# Patient Record
Sex: FEMALE | Race: WHITE | NOT HISPANIC OR LATINO | Employment: FULL TIME | URBAN - METROPOLITAN AREA
[De-identification: names, ages, dates, MRNs, and addresses within clinical notes are randomized per-mention and may not be internally consistent; named-entity substitution may affect disease eponyms.]

---

## 2019-01-24 RX ORDER — FLUOXETINE 40 MG/1
40 CAPSULE ORAL 2 TIMES DAILY
COMMUNITY

## 2019-01-24 RX ORDER — LOSARTAN POTASSIUM 50 MG/1
50 TABLET ORAL DAILY
COMMUNITY
End: 2021-10-27

## 2019-01-24 RX ORDER — BUSPIRONE HYDROCHLORIDE 15 MG/1
30 TABLET ORAL DAILY
COMMUNITY

## 2019-01-28 RX ORDER — POTASSIUM CHLORIDE 1500 MG/1
20 TABLET, EXTENDED RELEASE ORAL DAILY
COMMUNITY

## 2019-01-28 RX ORDER — TRAZODONE HYDROCHLORIDE 50 MG/1
50 TABLET, FILM COATED ORAL AT BEDTIME
COMMUNITY

## 2019-01-29 ENCOUNTER — ANESTHESIA EVENT (OUTPATIENT)
Dept: SURGERY | Facility: CLINIC | Age: 59
End: 2019-01-29
Payer: COMMERCIAL

## 2019-01-29 ENCOUNTER — HOSPITAL ENCOUNTER (OUTPATIENT)
Facility: CLINIC | Age: 59
Discharge: HOME | End: 2019-01-29
Attending: OPHTHALMOLOGY | Admitting: OPHTHALMOLOGY
Payer: COMMERCIAL

## 2019-01-29 ENCOUNTER — ANESTHESIA (OUTPATIENT)
Dept: SURGERY | Facility: CLINIC | Age: 59
End: 2019-01-29
Payer: COMMERCIAL

## 2019-01-29 VITALS
OXYGEN SATURATION: 95 % | TEMPERATURE: 97.9 F | BODY MASS INDEX: 47.19 KG/M2 | DIASTOLIC BLOOD PRESSURE: 91 MMHG | HEART RATE: 75 BPM | SYSTOLIC BLOOD PRESSURE: 144 MMHG | RESPIRATION RATE: 16 BRPM | WEIGHT: 276.4 LBS | HEIGHT: 64 IN

## 2019-01-29 DIAGNOSIS — H02.834 DERMATOCHALASIS OF UPPER AND LOWER EYELIDS OF BOTH EYES: Primary | ICD-10-CM

## 2019-01-29 DIAGNOSIS — H02.831 DERMATOCHALASIS OF UPPER AND LOWER EYELIDS OF BOTH EYES: Primary | ICD-10-CM

## 2019-01-29 DIAGNOSIS — H02.832 DERMATOCHALASIS OF UPPER AND LOWER EYELIDS OF BOTH EYES: Primary | ICD-10-CM

## 2019-01-29 DIAGNOSIS — H02.835 DERMATOCHALASIS OF UPPER AND LOWER EYELIDS OF BOTH EYES: Primary | ICD-10-CM

## 2019-01-29 PROCEDURE — 71000012 ZZH RECOVERY PHASE 1 LEVEL 1 FIRST HR: Performed by: OPHTHALMOLOGY

## 2019-01-29 PROCEDURE — 37000008 ZZH ANESTHESIA TECHNICAL FEE, 1ST 30 MIN: Performed by: OPHTHALMOLOGY

## 2019-01-29 PROCEDURE — 37000009 ZZH ANESTHESIA TECHNICAL FEE, EACH ADDTL 15 MIN: Performed by: OPHTHALMOLOGY

## 2019-01-29 PROCEDURE — 27210794 ZZH OR GENERAL SUPPLY STERILE: Performed by: OPHTHALMOLOGY

## 2019-01-29 PROCEDURE — 25000128 H RX IP 250 OP 636: Performed by: NURSE ANESTHETIST, CERTIFIED REGISTERED

## 2019-01-29 PROCEDURE — 36000058 ZZH SURGERY LEVEL 3 EA 15 ADDTL MIN

## 2019-01-29 PROCEDURE — 25000125 ZZHC RX 250: Performed by: NURSE ANESTHETIST, CERTIFIED REGISTERED

## 2019-01-29 PROCEDURE — 25000132 ZZH RX MED GY IP 250 OP 250 PS 637: Performed by: ANESTHESIOLOGY

## 2019-01-29 PROCEDURE — 37000009 ZZH ANESTHESIA TECHNICAL FEE, EACH ADDTL 15 MIN

## 2019-01-29 PROCEDURE — 36000056 ZZH SURGERY LEVEL 3 1ST 30 MIN: Performed by: OPHTHALMOLOGY

## 2019-01-29 PROCEDURE — 71000027 ZZH RECOVERY PHASE 2 EACH 15 MINS: Performed by: OPHTHALMOLOGY

## 2019-01-29 PROCEDURE — 40000170 ZZH STATISTIC PRE-PROCEDURE ASSESSMENT II: Performed by: OPHTHALMOLOGY

## 2019-01-29 PROCEDURE — 25000125 ZZHC RX 250: Performed by: OPHTHALMOLOGY

## 2019-01-29 PROCEDURE — 36000058 ZZH SURGERY LEVEL 3 EA 15 ADDTL MIN: Performed by: OPHTHALMOLOGY

## 2019-01-29 PROCEDURE — 25000566 ZZH SEVOFLURANE, EA 15 MIN: Performed by: OPHTHALMOLOGY

## 2019-01-29 RX ORDER — ERYTHROMYCIN 5 MG/G
OINTMENT OPHTHALMIC PRN
Status: DISCONTINUED | OUTPATIENT
Start: 2019-01-29 | End: 2019-01-29 | Stop reason: HOSPADM

## 2019-01-29 RX ORDER — HYDROMORPHONE HYDROCHLORIDE 1 MG/ML
.3-.5 INJECTION, SOLUTION INTRAMUSCULAR; INTRAVENOUS; SUBCUTANEOUS EVERY 10 MIN PRN
Status: DISCONTINUED | OUTPATIENT
Start: 2019-01-29 | End: 2019-01-29 | Stop reason: HOSPADM

## 2019-01-29 RX ORDER — LIDOCAINE HYDROCHLORIDE 20 MG/ML
INJECTION, SOLUTION INFILTRATION; PERINEURAL PRN
Status: DISCONTINUED | OUTPATIENT
Start: 2019-01-29 | End: 2019-01-29

## 2019-01-29 RX ORDER — FENTANYL CITRATE 50 UG/ML
INJECTION, SOLUTION INTRAMUSCULAR; INTRAVENOUS PRN
Status: DISCONTINUED | OUTPATIENT
Start: 2019-01-29 | End: 2019-01-29

## 2019-01-29 RX ORDER — NALOXONE HYDROCHLORIDE 0.4 MG/ML
.1-.4 INJECTION, SOLUTION INTRAMUSCULAR; INTRAVENOUS; SUBCUTANEOUS
Status: DISCONTINUED | OUTPATIENT
Start: 2019-01-29 | End: 2019-01-29 | Stop reason: HOSPADM

## 2019-01-29 RX ORDER — MULTIPLE VITAMINS W/ MINERALS TAB 9MG-400MCG
1 TAB ORAL DAILY
COMMUNITY
End: 2022-12-07

## 2019-01-29 RX ORDER — DEXAMETHASONE SODIUM PHOSPHATE 4 MG/ML
4 INJECTION, SOLUTION INTRA-ARTICULAR; INTRALESIONAL; INTRAMUSCULAR; INTRAVENOUS; SOFT TISSUE EVERY 10 MIN PRN
Status: DISCONTINUED | OUTPATIENT
Start: 2019-01-29 | End: 2019-01-29 | Stop reason: HOSPADM

## 2019-01-29 RX ORDER — MEPERIDINE HYDROCHLORIDE 25 MG/ML
12.5 INJECTION INTRAMUSCULAR; INTRAVENOUS; SUBCUTANEOUS
Status: DISCONTINUED | OUTPATIENT
Start: 2019-01-29 | End: 2019-01-29 | Stop reason: HOSPADM

## 2019-01-29 RX ORDER — ONDANSETRON 4 MG/1
4 TABLET, ORALLY DISINTEGRATING ORAL EVERY 30 MIN PRN
Status: DISCONTINUED | OUTPATIENT
Start: 2019-01-29 | End: 2019-01-29 | Stop reason: HOSPADM

## 2019-01-29 RX ORDER — HYDRALAZINE HYDROCHLORIDE 20 MG/ML
2.5-5 INJECTION INTRAMUSCULAR; INTRAVENOUS EVERY 10 MIN PRN
Status: DISCONTINUED | OUTPATIENT
Start: 2019-01-29 | End: 2019-01-29 | Stop reason: HOSPADM

## 2019-01-29 RX ORDER — ALBUTEROL SULFATE 0.83 MG/ML
2.5 SOLUTION RESPIRATORY (INHALATION) EVERY 6 HOURS PRN
COMMUNITY

## 2019-01-29 RX ORDER — ACETAMINOPHEN 325 MG/1
975 TABLET ORAL ONCE
Status: COMPLETED | OUTPATIENT
Start: 2019-01-29 | End: 2019-01-29

## 2019-01-29 RX ORDER — HYDROCODONE BITARTRATE AND ACETAMINOPHEN 5; 325 MG/1; MG/1
1 TABLET ORAL EVERY 6 HOURS PRN
Qty: 10 TABLET | Refills: 0 | Status: SHIPPED | OUTPATIENT
Start: 2019-01-29 | End: 2019-02-01

## 2019-01-29 RX ORDER — ERYTHROMYCIN 5 MG/G
OINTMENT OPHTHALMIC 3 TIMES DAILY
Qty: 2 TUBE | Refills: 1 | Status: SHIPPED | OUTPATIENT
Start: 2019-01-29 | End: 2019-02-28

## 2019-01-29 RX ORDER — FENTANYL CITRATE 50 UG/ML
25-50 INJECTION, SOLUTION INTRAMUSCULAR; INTRAVENOUS
Status: DISCONTINUED | OUTPATIENT
Start: 2019-01-29 | End: 2019-01-29 | Stop reason: HOSPADM

## 2019-01-29 RX ORDER — SODIUM CHLORIDE, SODIUM LACTATE, POTASSIUM CHLORIDE, CALCIUM CHLORIDE 600; 310; 30; 20 MG/100ML; MG/100ML; MG/100ML; MG/100ML
INJECTION, SOLUTION INTRAVENOUS CONTINUOUS
Status: DISCONTINUED | OUTPATIENT
Start: 2019-01-29 | End: 2019-01-29 | Stop reason: HOSPADM

## 2019-01-29 RX ORDER — ONDANSETRON 2 MG/ML
4 INJECTION INTRAMUSCULAR; INTRAVENOUS EVERY 30 MIN PRN
Status: DISCONTINUED | OUTPATIENT
Start: 2019-01-29 | End: 2019-01-29 | Stop reason: HOSPADM

## 2019-01-29 RX ORDER — PROPOFOL 10 MG/ML
INJECTION, EMULSION INTRAVENOUS CONTINUOUS PRN
Status: DISCONTINUED | OUTPATIENT
Start: 2019-01-29 | End: 2019-01-29

## 2019-01-29 RX ORDER — METHYLPREDNISOLONE 4 MG
TABLET, DOSE PACK ORAL
Qty: 21 TABLET | Refills: 0 | Status: SHIPPED | OUTPATIENT
Start: 2019-01-29 | End: 2022-12-07

## 2019-01-29 RX ORDER — SODIUM CHLORIDE, SODIUM LACTATE, POTASSIUM CHLORIDE, CALCIUM CHLORIDE 600; 310; 30; 20 MG/100ML; MG/100ML; MG/100ML; MG/100ML
INJECTION, SOLUTION INTRAVENOUS CONTINUOUS PRN
Status: DISCONTINUED | OUTPATIENT
Start: 2019-01-29 | End: 2019-01-29

## 2019-01-29 RX ORDER — PROPOFOL 10 MG/ML
INJECTION, EMULSION INTRAVENOUS PRN
Status: DISCONTINUED | OUTPATIENT
Start: 2019-01-29 | End: 2019-01-29

## 2019-01-29 RX ORDER — LABETALOL HYDROCHLORIDE 5 MG/ML
10 INJECTION, SOLUTION INTRAVENOUS
Status: DISCONTINUED | OUTPATIENT
Start: 2019-01-29 | End: 2019-01-29 | Stop reason: HOSPADM

## 2019-01-29 RX ORDER — DEXAMETHASONE SODIUM PHOSPHATE 4 MG/ML
INJECTION, SOLUTION INTRA-ARTICULAR; INTRALESIONAL; INTRAMUSCULAR; INTRAVENOUS; SOFT TISSUE PRN
Status: DISCONTINUED | OUTPATIENT
Start: 2019-01-29 | End: 2019-01-29

## 2019-01-29 RX ORDER — EPHEDRINE SULFATE 50 MG/ML
INJECTION, SOLUTION INTRAMUSCULAR; INTRAVENOUS; SUBCUTANEOUS PRN
Status: DISCONTINUED | OUTPATIENT
Start: 2019-01-29 | End: 2019-01-29

## 2019-01-29 RX ORDER — ONDANSETRON 2 MG/ML
INJECTION INTRAMUSCULAR; INTRAVENOUS PRN
Status: DISCONTINUED | OUTPATIENT
Start: 2019-01-29 | End: 2019-01-29

## 2019-01-29 RX ADMIN — LIDOCAINE HYDROCHLORIDE 100 MG: 20 INJECTION, SOLUTION INFILTRATION; PERINEURAL at 09:05

## 2019-01-29 RX ADMIN — MIDAZOLAM 2 MG: 1 INJECTION INTRAMUSCULAR; INTRAVENOUS at 09:02

## 2019-01-29 RX ADMIN — FENTANYL CITRATE 75 MCG: 50 INJECTION, SOLUTION INTRAMUSCULAR; INTRAVENOUS at 09:20

## 2019-01-29 RX ADMIN — Medication 5 MG: at 09:17

## 2019-01-29 RX ADMIN — Medication 5 MG: at 09:34

## 2019-01-29 RX ADMIN — ACETAMINOPHEN 975 MG: 325 TABLET, FILM COATED ORAL at 08:50

## 2019-01-29 RX ADMIN — Medication 5 MG: at 09:42

## 2019-01-29 RX ADMIN — Medication 5 MG: at 09:25

## 2019-01-29 RX ADMIN — ONDANSETRON 4 MG: 2 INJECTION INTRAMUSCULAR; INTRAVENOUS at 09:14

## 2019-01-29 RX ADMIN — PHENYLEPHRINE HYDROCHLORIDE 200 MCG: 10 INJECTION, SOLUTION INTRAMUSCULAR; INTRAVENOUS; SUBCUTANEOUS at 09:42

## 2019-01-29 RX ADMIN — SODIUM CHLORIDE, POTASSIUM CHLORIDE, SODIUM LACTATE AND CALCIUM CHLORIDE: 600; 310; 30; 20 INJECTION, SOLUTION INTRAVENOUS at 09:00

## 2019-01-29 RX ADMIN — Medication 5 MG: at 09:32

## 2019-01-29 RX ADMIN — PHENYLEPHRINE HYDROCHLORIDE 100 MCG: 10 INJECTION, SOLUTION INTRAMUSCULAR; INTRAVENOUS; SUBCUTANEOUS at 09:46

## 2019-01-29 RX ADMIN — PHENYLEPHRINE HYDROCHLORIDE 100 MCG: 10 INJECTION, SOLUTION INTRAMUSCULAR; INTRAVENOUS; SUBCUTANEOUS at 09:32

## 2019-01-29 RX ADMIN — DEXAMETHASONE SODIUM PHOSPHATE 4 MG: 4 INJECTION, SOLUTION INTRA-ARTICULAR; INTRALESIONAL; INTRAMUSCULAR; INTRAVENOUS; SOFT TISSUE at 09:14

## 2019-01-29 RX ADMIN — SODIUM CHLORIDE, POTASSIUM CHLORIDE, SODIUM LACTATE AND CALCIUM CHLORIDE: 600; 310; 30; 20 INJECTION, SOLUTION INTRAVENOUS at 11:05

## 2019-01-29 RX ADMIN — PHENYLEPHRINE HYDROCHLORIDE 100 MCG: 10 INJECTION, SOLUTION INTRAMUSCULAR; INTRAVENOUS; SUBCUTANEOUS at 09:29

## 2019-01-29 RX ADMIN — PHENYLEPHRINE HYDROCHLORIDE 100 MCG: 10 INJECTION, SOLUTION INTRAMUSCULAR; INTRAVENOUS; SUBCUTANEOUS at 09:54

## 2019-01-29 RX ADMIN — Medication 5 MG: at 09:29

## 2019-01-29 RX ADMIN — FENTANYL CITRATE 25 MCG: 50 INJECTION, SOLUTION INTRAMUSCULAR; INTRAVENOUS at 09:05

## 2019-01-29 RX ADMIN — PROPOFOL 200 MG: 10 INJECTION, EMULSION INTRAVENOUS at 09:05

## 2019-01-29 RX ADMIN — PROPOFOL 100 MCG/KG/MIN: 10 INJECTION, EMULSION INTRAVENOUS at 09:12

## 2019-01-29 RX ADMIN — PHENYLEPHRINE HYDROCHLORIDE 100 MCG: 10 INJECTION, SOLUTION INTRAMUSCULAR; INTRAVENOUS; SUBCUTANEOUS at 09:34

## 2019-01-29 ASSESSMENT — MIFFLIN-ST. JEOR: SCORE: 1818.74

## 2019-01-29 NOTE — ANESTHESIA CARE TRANSFER NOTE
Patient: Nadege Hill    Procedure(s):  BILATERAL UPPER LID BLEPHAROPLASTY  BILATERAL LOWER LID COSMETIC BLEPHAROPLASTY    Diagnosis: BILATERAL UPPER LID BLEPHAROPLASTY  Diagnosis Additional Information: No value filed.    Anesthesia Type:   General, LMA     Note:  Airway :Nasal Cannula  Patient transferred to:PACU  Comments: Transferred to PACU, spontaneous respirations, 3L oxygen via NC.  All monitors and alarms on and functioning, VSS.  Patient awake, comfortable.  Report to PACU RN.Handoff Report: Identifed the Patient, Identified the Reponsible Provider, Reviewed the pertinent medical history, Discussed the surgical course, Reviewed Intra-OP anesthesia mangement and issues during anesthesia, Set expectations for post-procedure period and Allowed opportunity for questions and acknowledgement of understanding      Vitals: (Last set prior to Anesthesia Care Transfer)    CRNA VITALS  1/29/2019 1035 - 1/29/2019 1112      1/29/2019             Pulse:  92    SpO2:  98 %    Resp Rate (observed):  1  (Abnormal)                 Electronically Signed By: ASA Brown CRNA  January 29, 2019  11:12 AM

## 2019-01-29 NOTE — OP NOTE
"Pre-operative Diagnosis:    1. Bilateral upper eyelid dermatochalasis contributing to visually significant mechanical ptosis   2. Bilateral lower eyelid fat prolapse with prominent tear trough and lower eyelid dermatochalasis        Post-operative Diagnosis: Same as above      Procedure(s):    1. Bilateral upper eyelid blepharoplasty    2. Bilateral lower eyelid cosmetic blepharoplasty with fat resection and reposition      Surgeon: Lakisha Huerta MD      Anesthesia:  Monitored anesthesia care with local anesthetic      Blood loss: <5 cc      Complications: None      Specimens: None      Operative Procedure:  The risks, benefits and alternatives to the procedure were discussed with the patient. A mirror was also used to show the patient what will and will not change following surgery and we discussed that some of her tear trough will persist but that the \"puffiness\" should improve. The patient agreed to the planned procedure and the patient was brought to the operating room.  A \"time out\" was performed to ensure the correct surgical site was being operated on. Topical anesthesia was placed in both eyes.  The eyelid skin was cleaned with isopropyl alcohol. The upper eyelid creases were marked.  Upper eyelid blepharoplasty incisions were marked with care taken to leave adequate anterior lamella to avoid post operative lagophthalmos. The tear troughs were also marked pre operatively.  Local anesthetic was injected into the operative site(s).  The patient was prepped and draped in the usual sterile fashion.      Attention was turned to the upper eyelids.  The upper eyelid skin was incised along the eyelid crease and the superior margin as outlined with a #15 blade.  A skin-muscle flap was created with the dissection plane between the orbicularis muscle and orbital septum. Hemostasis was achieved with cautery.  Hemostasis was confirmed. Medially, septum was opened, and the medial fat pad was conservatively excised.  " The same procedure was performed on the other upper eyelid. Hemostasis was achieved with cautery and confirmed.           Attention was turned to the lower eyelids.  A 4-0 silk suture was placed through the meibomian gland orifices on each lower eyelid. A transconjunctival incision was made and dissection was performed in a preseptal plane down to the inferior orbital rim.  The orbital septum was then opened and the medial and central fat pads are identified and mobilized. In between these two fat pads, the inferior oblique muscle was identified. The same dissection is then performed on the other side. Again, dissection was carried out inferior to the inferior orbital rim with a freer periosteal elevator in the preperiosteal plane.    The orbital septum was opened, and the fat pads were identified. Each of the three fat pads were then conservatively excised. The central and medial were then draped over the inferior orbital rim with 5-0 Prolene (at the end of the case, this was tied over a bolster).  During this, hemostasis was achieved with cautery.      Any pre operative asymmetry was taken into account during the resection and reposition to facilitate the best post operative symmetry.      This was was then performed on the both sides.  The transconjunctival incision was closed with interrupted, buried 7-0 vicryl sutures.     A skin pinch was then performed.  A marker was used to gita the redundant skin.  A #15 blade was used to incise the skin and then a Sonja scissors were used to excise skin only.  This was performed on both sides.  An orbicularis suspension was then performed using 5-0 vicryl suture; the suture was passed from the superior bleph incision through the lateral canthus out the lower incision and reversed.  This engaged the inferior lateral orbicularis and then tied placed the eyelid on ideal tension and in ideal position.  The same procedure was performed on the other side.  Hemostasis was  confirmed and the upper eyelid skin edges and lower eyelid skin wedges were closed with interrupted and running 6-0 fast absorbing gut sutures sutures. Traction sutures were removed.     The eyelids were washed with wet 4x4 gauze. Antibiotic ointment was placed on each eyelid. The patient was transferred to recovery in stable position.  Ice packs were placed on each eyelid.  The patient will return for a post-operative follow up appointment, sooner with any decrease in vision, increase in pain, redness, or bleeding.      Lakisha Huerta MD

## 2019-01-29 NOTE — ANESTHESIA PREPROCEDURE EVALUATION
Anesthesia Pre-Procedure Evaluation    Patient: Nadege Hill   MRN: [id number] : 1960          Preoperative Diagnosis: BILATERAL UPPER LID BLEPHAROPLASTY    Procedure(s):  BILATERAL UPPER LID BLEPHAROPLASTY  BILATERAL LOWER LID COSMETIC BLEPHAROPLASTY    No past medical history on file.  No past surgical history on file.    Anesthesia Evaluation     . Pt has had prior anesthetic.     No history of anesthetic complications          ROS/MED HX    ENT/Pulmonary:     (+)Mild Persistent asthma Treatment: Inhaler daily,  , . .   (-) sleep apnea   Neurologic:  - neg neurologic ROS     Cardiovascular:     (+) hypertension----. : . . . :. .       METS/Exercise Tolerance:     Hematologic:  - neg hematologic  ROS       Musculoskeletal:         GI/Hepatic:        (-) GERD   Renal/Genitourinary:         Endo:     (+) Obesity, .      Psychiatric:     (+) psychiatric history anxiety and depression      Infectious Disease:         Malignancy:         Other:                          Physical Exam  Normal systems: cardiovascular, pulmonary and dental    Airway   Mallampati: III  TM distance: >3 FB  Neck ROM: full    Dental     Cardiovascular       Pulmonary             Lab Results   Component Value Date    HGB 10.3 (L) 10/28/2005    HCG Negative 10/26/2005       Preop Vitals  BP Readings from Last 3 Encounters:   No data found for BP    Pulse Readings from Last 3 Encounters:   No data found for Pulse      Resp Readings from Last 3 Encounters:   No data found for Resp    SpO2 Readings from Last 3 Encounters:   No data found for SpO2      Temp Readings from Last 1 Encounters:   No data found for Temp    Ht Readings from Last 1 Encounters:   No data found for Ht      Wt Readings from Last 1 Encounters:   No data found for Wt    There is no height or weight on file to calculate BMI.       Anesthesia Plan      History & Physical Review  History and physical reviewed and following examination; no interval change.    ASA Status:   2 .    NPO Status:  > 8 hours    Plan for General and LMA with Intravenous and Propofol induction. Maintenance will be TIVA.    PONV prophylaxis:  Ondansetron (or other 5HT-3) and Dexamethasone or Solumedrol       Postoperative Care  Postoperative pain management:  Multi-modal analgesia.      Consents  Anesthetic plan, risks, benefits and alternatives discussed with:  Patient..                 Ros Mancuso MD

## 2019-01-29 NOTE — DISCHARGE INSTRUCTIONS
Today you were given 975 mg of Tylenol at 9am. The recommended daily maximum dose is 4000 mg.     Same Day Surgery Discharge Instructions for  Sedation and General Anesthesia       It's not unusual to feel dizzy, light-headed or faint for up to 24 hours after surgery or while taking pain medication.  If you have these symptoms: sit for a few minutes before standing and have someone assist you when you get up to walk or use the bathroom.      You should rest and relax for the next 24 hours. We recommend you make arrangements to have an adult stay with you for at least 24 hours after your discharge.  Avoid hazardous and strenuous activity.      DO NOT DRIVE any vehicle or operate mechanical equipment for 24 hours following the end of your surgery.  Even though you may feel normal, your reactions may be affected by the medication you have received.      Do not drink alcoholic beverages for 24 hours following surgery.       Slowly progress to your regular diet as you feel able. It's not unusual to feel nauseated and/or vomit after receiving anesthesia.  If you develop these symptoms, drink clear liquids (apple juice, ginger ale, broth, 7-up, etc. ) until you feel better.  If your nausea and vomiting persists for 24 hours, please notify your surgeon.        All narcotic pain medications, along with inactivity and anesthesia, can cause constipation. Drinking plenty of liquids and increasing fiber intake will help.      For any questions of a medical nature, call your surgeon.      Do not make important decisions for 24 hours.      If you had general anesthesia, you may have a sore throat for a couple of days related to the breathing tube used during surgery.  You may use Cepacol lozenges to help with this discomfort.  If it worsens or if you develop a fever, contact your surgeon.       If you feel your pain is not well managed with the pain medications prescribed by your surgeon, please contact your surgeon's office to  let them know so they can address your concerns.         [facility]   Eyelid/Orbital Surgery Discharge Instructions  Dr. Lakisha Huerta     ICE COMPRESSES  Immediately following surgery, you should begin to apply ice compresses.  Apply a cold gel pack or wrap a clean washcloth around a cup of crushed ice in a plastic bag (a bag of frozen peas also works well) and hold the cold compresses directly against the closed eyelid (s).Apply cold pack for a minimum of six times daily for no longer than 15 minutes at a time. Continue cold compresses every day until the bruising and swelling begin to subside.  This can vary for each patient, but three days may be common.    HOT COMPRESSES  After your swelling and bruising have begun to subside, hot compresses should be applied.  Take a clean washcloth and wring it out in hot water (as warm as you can tolerate comfortably).  Hold this warm compress against the closed eyelid(s) at least six times per day for 15 minutes.  This should be continued for about two weeks.    OINTMENT  You may be given some ointment when you leave the hospital.  Apply this ointment as directed per pharmacy label for 7 days.  Expect some blurring of vision from the ointment.     ACTIVITY  Avoid heavy lifting or vigorous exercise for one week after surgery.  You may resume regular activities as tolerated.  You may shower and wash your hair on the day after surgery; be careful to avoid soaking the wounds or getting shampoo in your eyes. While your eyes are still swollen, it is recommended you sleep on your back and elevate your head with 2-3 pillows.    MEDICATION  If the doctor has given you some medications to take after surgery, please take these according to the instructions on the bottle.  Pain medications may make you drowsy so do not drive, operate heavy machinery, or use alcohol while taking it.  When you feel that you do not need the prescription pain medication, you may  substitute Extra Strength Tylenol for mild pain by also following the directions on the bottle.    If you were taking Aspirin prior to your surgery, you may resume this medication tomorrow.    If you were on an anticoagulant, you may resume taking it with the next scheduled dose.      WHAT TO EXPECT  You should expect some slight oozing of blood from the incision site over the first two to three days after surgery.  Swelling and bruising will occur for one to two weeks or longer.  You may also experience itching and tearing during the first several weeks after surgery.  This is part of the normal healing process    QUESTIONS  Please feel free to contact the office, should you have any questions that are not answered above.  The phone number is [phone].  Please call immediately if you are unable to establish vision in the operative eye, you are experiencing heavy bleeding that will not stop with gentle pressure or you have any signs of an infection (greenish/yellow discharge or progressive redness).    [facility]  [address]  [place] [address]  [phone]

## 2019-01-29 NOTE — ANESTHESIA POSTPROCEDURE EVALUATION
Patient: Nadege Hill    Procedure(s):  BILATERAL UPPER LID BLEPHAROPLASTY  BILATERAL LOWER LID COSMETIC BLEPHAROPLASTY    Diagnosis:BILATERAL UPPER LID BLEPHAROPLASTY  Diagnosis Additional Information: No value filed.    Anesthesia Type:  General, LMA    Note:  Anesthesia Post Evaluation    Patient location during evaluation: PACU  Patient participation: Able to fully participate in evaluation  Level of consciousness: awake and alert  Pain management: adequate  Airway patency: patent  Cardiovascular status: acceptable  Respiratory status: acceptable and unassisted  Hydration status: acceptable  PONV: none             Last vitals:  Vitals:    01/29/19 1119 01/29/19 1130 01/29/19 1145   BP: 135/88 144/82 (!) 156/96   Pulse:  80 75   Resp: 17 14 16   Temp:      SpO2: 90% 94% 93%         Electronically Signed By: Ros Mancuso MD  January 29, 2019  12:46 PM

## 2019-01-29 NOTE — LETTER
January 29, 2019      [place]ade[place] Hill  [address] [facility] [facility] [address] [facility] [id number]        To Whom It May Concern:    Nadege Hill was seen in our Same Day Surgery Department.. She may return to work with the following: when not taking pain medication. Nadege is not to lift greater than 10lb for 1 week.      Sincerely,        Same Day Surgery

## 2021-09-09 ENCOUNTER — TRANSFERRED RECORDS (OUTPATIENT)
Dept: HEALTH INFORMATION MANAGEMENT | Facility: CLINIC | Age: 61
End: 2021-09-09
Payer: COMMERCIAL

## 2021-09-09 LAB — EJECTION FRACTION: 60 %

## 2021-10-27 ENCOUNTER — OFFICE VISIT (OUTPATIENT)
Dept: CARDIOLOGY | Facility: CLINIC | Age: 61
End: 2021-10-27
Payer: COMMERCIAL

## 2021-10-27 VITALS
DIASTOLIC BLOOD PRESSURE: 80 MMHG | WEIGHT: 234.2 LBS | HEART RATE: 87 BPM | SYSTOLIC BLOOD PRESSURE: 120 MMHG | BODY MASS INDEX: 40.2 KG/M2 | OXYGEN SATURATION: 97 %

## 2021-10-27 DIAGNOSIS — Z13.6 SCREENING FOR HEART DISEASE: Primary | ICD-10-CM

## 2021-10-27 DIAGNOSIS — E78.5 DYSLIPIDEMIA: ICD-10-CM

## 2021-10-27 DIAGNOSIS — I51.7 LEFT VENTRICULAR HYPERTROPHY: ICD-10-CM

## 2021-10-27 DIAGNOSIS — I10 BENIGN ESSENTIAL HYPERTENSION: ICD-10-CM

## 2021-10-27 PROCEDURE — 93000 ELECTROCARDIOGRAM COMPLETE: CPT | Performed by: INTERNAL MEDICINE

## 2021-10-27 PROCEDURE — 99204 OFFICE O/P NEW MOD 45 MIN: CPT | Mod: 25 | Performed by: INTERNAL MEDICINE

## 2021-10-27 RX ORDER — LOSARTAN POTASSIUM AND HYDROCHLOROTHIAZIDE 25; 100 MG/1; MG/1
1 TABLET ORAL DAILY
Qty: 90 TABLET | Refills: 3 | COMMUNITY
Start: 2021-10-27

## 2021-10-27 NOTE — PROGRESS NOTES
Cardiology Clinic Consultation:    October 27, 2021   Patient Name: Nadege Hill  Patient MRN: [id number]    Consult indication: murmur    HPI:    I had the opportunity to see patient Nadege Hill in cardiology clinic for a consultation. Patient is followed by our colleague [facility] with Primary Care.     As you know, patient is a pleasant 61-year-old female with a past medical history significant for obesity, hypertension, who presents for further evaluation and management of left ventricular hypertrophy found on recent TTE.    At baseline, patient states that she is fairly physically active.  She has been exercising regularly, walks her dog (bhatia retriever) multiple times a day.  She denies any abnormal symptoms of chest pain, shortness of breath, recent change in exertional capacity.  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    She was recently seen by her PCP for a toenail issue, and was noted to have a cardiac murmur on physical exam.  Patient underwent a TTE through the [facility] system 9/9/2021, images are not available for review, however report notes LVEF 60%, sigmoid septum with mild concentric hypertrophy, no significant valvular abnormalities.    Blood pressure today in clinic was 120/80 mmHg.  Patient was recently seen by her PCP for hypertension, and was increased to losartan-hydrochlorothiazide 100-25 mg daily.  On review of her prior records, dating back several years, blood pressure has been intermittently hypertensive, with systolics ranging from the 130 to 140 mmHg range.    Patient endorses daytime somnolence, but does not nap during the day, unclear if she snores.  She also notes that it is relatively easy for her to fall asleep.    No family history of early ASCVD, no family history of hypertrophic cardiomyopathy or sudden cardiac death.    Assessment and  Plan/Recommendations:    # Hypertension, sigmoid septum and LVH on TTE ([facility] 9/2021).     Echocardiographic findings are most likely related to longstanding hypertension.  Patient denies symptoms concerning for angina or decompensated heart failure.    Blood pressures remain mildly elevated, however patient recently had her losartan-hydrochlorothiazide dosage adjusted by PCP.  Recommended patient monitor her blood pressures at home, goal blood pressure less than 130/80 mmHg, or as low as tolerated.  Encouraged continued healthy lifestyle habits including a heart healthy diet, regular exercise.    Recommended she interface with her PCP regarding a Sleep Medicine consultation if her symptoms of daytime somnolence persist or worsen.      Follow-up in 1 year for reassessment with a TTE at that time, fasting lipids, or sooner as needed.    Thank you for allowing our team to participate in the care of Nadege Hill.  Please do not hesitate to call or page me with any questions or concerns.    Sincerely,     Shane Geiger MD, [facility]  Cardiology  Text Page   October 27, 2021    Voice recognition software utilized. Although reviewed after completion, some word and grammatical errors may be present.    Total time spent on this encounter: 45 minutes, providing care in this encounter including, but not limited to, reviewing prior medical records, laboratory data, imaging studies, diagnostic studies, procedure notes, formulating an assessment and plan, recommendations.    Past Medical History:     Patient Active Problem List   Diagnosis     Benign essential hypertension     Dyslipidemia     Left ventricular hypertrophy       Past Surgical History:   Past Surgical History:   Procedure Laterality Date     BLEPHAROPLASTY BILATERAL Bilateral 1/29/2019    Procedure: BILATERAL UPPER LID BLEPHAROPLASTY;  Surgeon: Lakisha Huerta MD;  Location: [facility] OR     COSMETIC BLEPHAROPLASTY LOWER LID Bilateral 1/29/2019     Procedure: BILATERAL LOWER LID COSMETIC BLEPHAROPLASTY;  Surgeon: Lakisha Huerta MD;  Location:  OR     GYN SURGERY      [facility]     GYN SURGERY         Medications (outpatient):  Current Outpatient Medications   Medication Sig Dispense Refill     albuterol (PROVENTIL) (2.5 MG/3ML) 0.083% neb solution Take 2.5 mg by nebulization every 6 hours as needed for shortness of breath / dyspnea or wheezing       busPIRone (BUSPAR) 15 MG tablet Take 30 mg by mouth daily        FLUoxetine (PROZAC) 40 MG capsule Take 40 mg by mouth 2 times daily       fluticasone-salmeterol (ADVAIR) 100-50 MCG/DOSE inhaler Inhale 1 puff into the lungs every 12 hours       losartan-hydrochlorothiazide (HYZAAR) 100-25 MG tablet Take 1 tablet by mouth daily 90 tablet 3     multivitamin w/minerals (MULTI-VITAMIN) tablet Take 1 tablet by mouth daily       potassium chloride ER (K-DUR/KLOR-CON M) 20 MEQ CR tablet Take 20 mEq by mouth daily       traZODone (DESYREL) 50 MG tablet Take 50 mg by mouth At Bedtime       methylPREDNISolone (MEDROL DOSEPAK) 4 MG tablet therapy pack Follow Package Directions (Patient not taking: Reported on 10/27/2021) 21 tablet 0       Allergies:  Allergies   Allergen Reactions     Kiwi Rash     Pcn [Penicillins] Rash       Social History:   History   Drug Use Unknown      History   Smoking Status     Never Smoker   Smokeless Tobacco     Never Used     Social History    Substance and Sexual Activity      Alcohol use: Yes        Comment: socially       Family History:  History reviewed. No pertinent family history.    Review of Systems:   A complete review of systems was negative except as mentioned in the History of Present Illness.     Objective & Physical Exam:  /80 (BP Location: Right arm, Patient Position: Chair, Cuff Size: Adult Large)   Pulse 87   Wt 106.2 kg (234 lb 3.2 oz)   SpO2 97%   BMI 40.20 kg/m    Wt Readings from Last 2 Encounters:   10/27/21 106.2 kg (234 lb 3.2 oz)   01/29/19 125.4 kg  (276 lb 6.4 oz)     Body mass index is 40.2 kg/m .   Body surface area is 2.19 meters squared.    Constitutional: appears stated age, in no apparent distress, appears to be well nourished  Eyes: sclera anicteric, conjunctiva normal  ENT: normocephalic, without obvious abnormality, atraumatic  Pulmonary: clear to auscultation bilaterally, no wheezes, no rales, no increased work of breathing  Cardiovascular: JVP normal, regular rate, regular rhythm, normal S1 and S2, no S3, S4, 1/6 CORINNA at the RUSB, no lower extremity edema  Gastrointestinal: abdominal exam benign  Neurologic: awake, alert, face symmetrical, moves all extremities  Skin: no jaundice  Psychiatric: affect is normal, answers questions appropriately, oriented to self and place      Data reviewed:  Last lipids from 12/22/2020 through [facility] notable for total cholesterol 179, HDL 68, triglycerides 89, LDL 93

## 2021-10-27 NOTE — LETTER
10/27/2021    [facility]  [address] [place] [address]    RE: Nadege Hill       Dear Colleague,    I had the pleasure of seeing Nadege Hill in the [facility].        Cardiology Clinic Consultation:    October 27, 2021   Patient Name: Nadege Hill  Patient MRN: [id number]    Consult indication: murmur    HPI:    I had the opportunity to see patient Nadege Hill in cardiology clinic for a consultation. Patient is followed by our colleague [facility].     As you know, patient is a pleasant 61-year-old female with a past medical history significant for obesity, hypertension, who presents for further evaluation and management of left ventricular hypertrophy found on recent TTE.    At baseline, patient states that she is fairly physically active.  She has been exercising regularly, walks her dog (bhatia retriever) multiple times a day.  She denies any abnormal symptoms of chest pain, shortness of breath, recent change in exertional capacity.  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    She was recently seen by her PCP for a toenail issue, and was noted to have a cardiac murmur on physical exam.  Patient underwent a TTE through the [employer] system 9/9/2021, images are not available for review, however report notes LVEF 60%, sigmoid septum with mild concentric hypertrophy, no significant valvular abnormalities.    Blood pressure today in clinic was 120/80 mmHg.  Patient was recently seen by her PCP for hypertension, and was increased to losartan-hydrochlorothiazide 100-25 mg daily.  On review of her prior records, dating back several years, blood pressure has been intermittently hypertensive, with systolics ranging from the 130 to 140 mmHg range.    Patient endorses daytime somnolence, but does not nap during  the day, unclear if she snores.  She also notes that it is relatively easy for her to fall asleep.    No family history of early ASCVD, no family history of hypertrophic cardiomyopathy or sudden cardiac death.    Assessment and Plan/Recommendations:    # Hypertension, sigmoid septum and LVH on TTE (OSH 9/2021).     Echocardiographic findings are most likely related to longstanding hypertension.  Patient denies symptoms concerning for angina or decompensated heart failure.    Blood pressures remain mildly elevated, however patient recently had her losartan-hydrochlorothiazide dosage adjusted by PCP.  Recommended patient monitor her blood pressures at home, goal blood pressure less than 130/80 mmHg, or as low as tolerated.  Encouraged continued healthy lifestyle habits including a heart healthy diet, regular exercise.    Recommended she interface with her PCP regarding a Sleep Medicine consultation if her symptoms of daytime somnolence persist or worsen.      Follow-up in 1 year for reassessment with a TTE at that time, fasting lipids, or sooner as needed.    Thank you for allowing our team to participate in the care of Nadege Hill.  Please do not hesitate to call or page me with any questions or concerns.    Sincerely,     Shane Geiger MD, [facility]  Cardiology  Text Page   October 27, 2021    Voice recognition software utilized. Although reviewed after completion, some word and grammatical errors may be present.    Total time spent on this encounter: 45 minutes, providing care in this encounter including, but not limited to, reviewing prior medical records, laboratory data, imaging studies, diagnostic studies, procedure notes, formulating an assessment and plan, recommendations.    Past Medical History:     Patient Active Problem List   Diagnosis     Benign essential hypertension     Dyslipidemia     Left ventricular hypertrophy       Past Surgical History:   Past Surgical History:   Procedure  Laterality Date     BLEPHAROPLASTY BILATERAL Bilateral 1/29/2019    Procedure: BILATERAL UPPER LID BLEPHAROPLASTY;  Surgeon: Lakisha Huerta MD;  Location: [facility] OR     COSMETIC BLEPHAROPLASTY LOWER LID Bilateral 1/29/2019    Procedure: BILATERAL LOWER LID COSMETIC BLEPHAROPLASTY;  Surgeon: Lakisha Huerta MD;  Location:  OR     GYN SURGERY      [facility]     GYN SURGERY         Medications (outpatient):  Current Outpatient Medications   Medication Sig Dispense Refill     albuterol (PROVENTIL) (2.5 MG/3ML) 0.083% neb solution Take 2.5 mg by nebulization every 6 hours as needed for shortness of breath / dyspnea or wheezing       busPIRone (BUSPAR) 15 MG tablet Take 30 mg by mouth daily        FLUoxetine (PROZAC) 40 MG capsule Take 40 mg by mouth 2 times daily       fluticasone-salmeterol (ADVAIR) 100-50 MCG/DOSE inhaler Inhale 1 puff into the lungs every 12 hours       losartan-hydrochlorothiazide (HYZAAR) 100-25 MG tablet Take 1 tablet by mouth daily 90 tablet 3     multivitamin w/minerals (MULTI-VITAMIN) tablet Take 1 tablet by mouth daily       potassium chloride ER (K-DUR/KLOR-CON M) 20 MEQ CR tablet Take 20 mEq by mouth daily       traZODone (DESYREL) 50 MG tablet Take 50 mg by mouth At Bedtime       methylPREDNISolone (MEDROL DOSEPAK) 4 MG tablet therapy pack Follow Package Directions (Patient not taking: Reported on 10/27/2021) 21 tablet 0       Allergies:  Allergies   Allergen Reactions     Kiwi Rash     Pcn [Penicillins] Rash       Social History:   History   Drug Use Unknown      History   Smoking Status     Never Smoker   Smokeless Tobacco     Never Used     Social History    Substance and Sexual Activity      Alcohol use: Yes        Comment: socially       Family History:  History reviewed. No pertinent family history.    Review of Systems:   A complete review of systems was negative except as mentioned in the History of Present Illness.     Objective & Physical Exam:  /80 (BP Location:  Right arm, Patient Position: Chair, Cuff Size: Adult Large)   Pulse 87   Wt 106.2 kg (234 lb 3.2 oz)   SpO2 97%   BMI 40.20 kg/m    Wt Readings from Last 2 Encounters:   10/27/21 106.2 kg (234 lb 3.2 oz)   01/29/19 125.4 kg (276 lb 6.4 oz)     Body mass index is 40.2 kg/m .   Body surface area is 2.19 meters squared.    Constitutional: appears stated age, in no apparent distress, appears to be well nourished  Eyes: sclera anicteric, conjunctiva normal  ENT: normocephalic, without obvious abnormality, atraumatic  Pulmonary: clear to auscultation bilaterally, no wheezes, no rales, no increased work of breathing  Cardiovascular: JVP normal, regular rate, regular rhythm, normal S1 and S2, no S3, S4, 1/6 CORINNA at the RUSB, no lower extremity edema  Gastrointestinal: abdominal exam benign  Neurologic: awake, alert, face symmetrical, moves all extremities  Skin: no jaundice  Psychiatric: affect is normal, answers questions appropriately, oriented to self and place      Data reviewed:  Last lipids from 12/22/2020 through [employer] notable for total cholesterol 179, HDL 68, triglycerides 89, LDL 93      Thank you for allowing me to participate in the care of your patient.      Sincerely,     Shane Geiger MD     [facility]  cc:   No referring provider defined for this encounter.

## 2021-10-27 NOTE — PATIENT INSTRUCTIONS
October 27, 2021    Thank you for allowing our Cardiology team to participate in your care.     Please note the following changes to your heart treatment plan:     Medication changes:   - none  - monitor BPs at home, goal BP <130/80    Tests to be done:  - FASTING cholesterol labs in 1 year  - TTE (heart ultrasound) in 1 year    Follow up:  - Follow up in 1 year, or sooner as needed.      Please contact our team at [phone] or [phone] for any questions or concerns.   For scheduling, please call [phone].  If you are having a medical emergency, please call [phone].     Sincerely,    Shane Geiger MD, [facility]  Cardiology    [facility] - [facility] - [facility] [place]

## 2021-10-31 ENCOUNTER — HEALTH MAINTENANCE LETTER (OUTPATIENT)
Age: 61
End: 2021-10-31

## 2022-10-23 ENCOUNTER — HEALTH MAINTENANCE LETTER (OUTPATIENT)
Age: 62
End: 2022-10-23

## 2022-10-27 ENCOUNTER — LAB (OUTPATIENT)
Dept: LAB | Facility: CLINIC | Age: 62
End: 2022-10-27
Payer: COMMERCIAL

## 2022-10-27 ENCOUNTER — HOSPITAL ENCOUNTER (OUTPATIENT)
Dept: CARDIOLOGY | Facility: CLINIC | Age: 62
Discharge: HOME | End: 2022-10-27
Attending: INTERNAL MEDICINE | Admitting: INTERNAL MEDICINE
Payer: COMMERCIAL

## 2022-10-27 DIAGNOSIS — I51.7 LEFT VENTRICULAR HYPERTROPHY: ICD-10-CM

## 2022-10-27 DIAGNOSIS — E78.5 DYSLIPIDEMIA: ICD-10-CM

## 2022-10-27 LAB
ALT SERPL W P-5'-P-CCNC: 12 U/L (ref 10–35)
CHOLEST SERPL-MCNC: 210 MG/DL
HDLC SERPL-MCNC: 77 MG/DL
LDLC SERPL CALC-MCNC: 108 MG/DL
LVEF ECHO: NORMAL
NONHDLC SERPL-MCNC: 133 MG/DL
TRIGL SERPL-MCNC: 125 MG/DL

## 2022-10-27 PROCEDURE — 93308 TTE F-UP OR LMTD: CPT | Mod: 26 | Performed by: INTERNAL MEDICINE

## 2022-10-27 PROCEDURE — 93321 DOPPLER ECHO F-UP/LMTD STD: CPT

## 2022-10-27 PROCEDURE — 93325 DOPPLER ECHO COLOR FLOW MAPG: CPT | Mod: 26 | Performed by: INTERNAL MEDICINE

## 2022-10-27 PROCEDURE — 84460 ALANINE AMINO (ALT) (SGPT): CPT

## 2022-10-27 PROCEDURE — 36415 COLL VENOUS BLD VENIPUNCTURE: CPT

## 2022-10-27 PROCEDURE — 93325 DOPPLER ECHO COLOR FLOW MAPG: CPT

## 2022-10-27 PROCEDURE — 93321 DOPPLER ECHO F-UP/LMTD STD: CPT | Mod: 26 | Performed by: INTERNAL MEDICINE

## 2022-10-27 PROCEDURE — 80061 LIPID PANEL: CPT

## 2022-11-02 NOTE — RESULT ENCOUNTER NOTE
Results reviewed, please let the patient know that overall findings are reassuring, normal biventricular function, no significant valvular disease. Will review lipids at clinic visit. Follow up as previously planned, thanks!

## 2022-12-07 ENCOUNTER — OFFICE VISIT (OUTPATIENT)
Dept: CARDIOLOGY | Facility: CLINIC | Age: 62
End: 2022-12-07
Payer: COMMERCIAL

## 2022-12-07 VITALS
DIASTOLIC BLOOD PRESSURE: 66 MMHG | OXYGEN SATURATION: 97 % | BODY MASS INDEX: 41.97 KG/M2 | SYSTOLIC BLOOD PRESSURE: 102 MMHG | HEART RATE: 80 BPM | WEIGHT: 244.5 LBS

## 2022-12-07 DIAGNOSIS — I10 BENIGN ESSENTIAL HYPERTENSION: Primary | ICD-10-CM

## 2022-12-07 PROCEDURE — 99214 OFFICE O/P EST MOD 30 MIN: CPT | Performed by: INTERNAL MEDICINE

## 2022-12-07 NOTE — PATIENT INSTRUCTIONS
December 7, 2022    Thank you for allowing our Cardiology team to participate in your care.     Please note the following changes to your heart treatment plan:     Medication changes:   - none    Tests to be done:  - none    Follow up:  - Follow up as needed.      For scheduling, please call [phone].    Please contact our team at [phone] (Toya LUTZ) or [phone] for any questions or concerns.     If you are having a medical emergency, please call [phone].     Sincerely,    Shane Geiger MD, FACC  Cardiology    [facility] - [facility] - [facility] [place]

## 2022-12-07 NOTE — LETTER
12/7/2022    [facility]  [address] [place] [address]    RE: Nadege Hill       Dear Colleague,     I had the pleasure of seeing Nadege Hill in the [facility].      Cardiology Clinic Progress Note:    December 7, 2022   Patient Name: Nadege Hill  Patient MRN: [id number]     Consult indication: HTN    HPI:    I had the opportunity to see patient Nadege Hill in cardiology clinic for a follow up visit. Patient is followed by our colleagues at [facility] with Primary Care.     As you know, patient is a pleasant 62-year-old female with a past medical history significant for obesity, hypertension, who presents for follow up.    I had initially met patient in cardiology clinic for consultation 10/27/2021 after an echocardiogram through the [employer] system demonstrated mild concentric hypertrophy.  She had been seen by her PCP for a toenail issue, and was noted to have a cardiac murmur on physical exam.  Patient underwent a TTE through the [employer] system 9/9/2021, images are not available for review, however report notes LVEF 60%, sigmoid septum with mild concentric hypertrophy, no significant valvular abnormalities.    We discussed the significance of this, in the context of longstanding hypertension.  Since then, patient reports that she has been doing well.  Blood pressure today in clinic is 102/66 mmHg, and at home around 110/70 mmHg.  She exercises regularly by power walking twice a day with her bhatia retriever, other women in her neighborhood of seen her walking and have actually joined her.  She is also been more mindful about her diet, avoiding carbohydrates, and incorporating more vegetable/plants.  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  She denies any chest pain, chest pressure, abnormal shortness of breath, and has gone down in pant size several sizes.  Her son recently got  .    No family history of early ASCVD, no family history of hypertrophic cardiomyopathy or sudden cardiac death.    TTE 10/27/2022  Interpretation Summary     The left ventricle is normal in structure, function and size.  Grade I or early diastolic dysfunction.  The right ventricle is normal in structure, function and size.  No significant valve issues. The study was technically difficult.    Assessment and Plan/Recommendations:    In summary, patient is a pleasant 62-year-old female with a past medical history significant for obesity, hypertension, who presents for follow up.  Blood pressure is under better control now, and she is exercising regularly, also adhering to a heart healthy diet.  She denies symptoms concerning for angina or decompensated heart failure.  TTE 10/27/2022 was reviewed personally, overall findings are favorable, demonstrating normal biventricular function, no significant valvular abnormalities.  She does still have borderline concentric LVH, with expected Doppler parameters suggesting mild/early diastolic dysfunction, however she does not have any clinical evidence of heart failure.  Recommend continued blood pressure control, healthy lifestyle habits including regular exercise, efforts at weight loss.  Follow-up in cardiology clinic as needed in the future.    Thank you for allowing our team to participate in the care of Nadege Hill.  Please do not hesitate to call or page me with any questions or concerns.    Sincerely,     Shane Geiger MD, [facility]  Cardiology  Text Page   December 7, 2022    Voice recognition software utilized.     Total time spent on this encounter: 35 minutes, providing care in this encounter including, but not limited to, reviewing prior medical records, laboratory data, imaging studies, diagnostic studies, procedure notes, formulating an assessment and plan, recommendations, discussion and counseling with patient face to face,  dictation.    Past Medical History:     Past Medical History:   Diagnosis Date     Anxiety      Benign essential hypertension 10/27/2021     Cancer (H)     melanoma skin cancer     Depression      Dyslipidemia 10/27/2021     Hypertension      Left ventricular hypertrophy 10/27/2021     Renal disease     chronic diuretic use     Uncomplicated asthma         Past Surgical History:   Past Surgical History:   Procedure Laterality Date     BLEPHAROPLASTY BILATERAL Bilateral 1/29/2019    Procedure: BILATERAL UPPER LID BLEPHAROPLASTY;  Surgeon: Lakisha Huerta MD;  Location:  OR     COSMETIC BLEPHAROPLASTY LOWER LID Bilateral 1/29/2019    Procedure: BILATERAL LOWER LID COSMETIC BLEPHAROPLASTY;  Surgeon: Lakisha Huerta MD;  Location:  OR     GYN SURGERY      [facility]     GYN SURGERY         Medications (outpatient):  Current Outpatient Medications   Medication Sig Dispense Refill     albuterol (PROVENTIL) (2.5 MG/3ML) 0.083% neb solution Take 2.5 mg by nebulization every 6 hours as needed for shortness of breath / dyspnea or wheezing       busPIRone (BUSPAR) 15 MG tablet Take 30 mg by mouth daily        FLUoxetine (PROZAC) 40 MG capsule Take 40 mg by mouth 2 times daily       fluticasone-salmeterol (ADVAIR) 100-50 MCG/DOSE inhaler Inhale 1 puff into the lungs every 12 hours       losartan-hydrochlorothiazide (HYZAAR) 100-25 MG tablet Take 1 tablet by mouth daily 90 tablet 3     potassium chloride ER (K-DUR/KLOR-CON M) 20 MEQ CR tablet Take 20 mEq by mouth daily       traZODone (DESYREL) 50 MG tablet Take 50 mg by mouth At Bedtime PRN       methylPREDNISolone (MEDROL DOSEPAK) 4 MG tablet therapy pack Follow Package Directions (Patient not taking: Reported on 10/27/2021) 21 tablet 0     multivitamin w/minerals (MULTI-VITAMIN) tablet Take 1 tablet by mouth daily         Allergies:  Allergies   Allergen Reactions     Kiwi Rash     Pcn [Penicillins] Rash       Social History:   History   Drug Use Unknown       History   Smoking Status     Never   Smokeless Tobacco     Never     Social History    Substance and Sexual Activity      Alcohol use: Yes        Comment: socially       Family History:  History reviewed. No pertinent family history.    Review of Systems:   A complete review of systems was negative except as mentioned in the History of Present Illness.     Objective & Physical Exam:  /66   Pulse 80   Wt 110.9 kg (244 lb 8 oz)   SpO2 97%   BMI 41.97 kg/m    Wt Readings from Last 2 Encounters:   22 110.9 kg (244 lb 8 oz)   10/27/21 106.2 kg (234 lb 3.2 oz)     Body mass index is 41.97 kg/m .   Body surface area is 2.24 meters squared.    Constitutional: appears stated age, in no apparent distress, appears to be well nourished  Head: normocephalic, atraumatic  Neck: supple, trachea midline, no bruit bilaterally   Pulmonary: clear to auscultation bilaterally, no wheezes, no rales, no increased work of breathing  Cardiovascular: JVP normal, regular rate, regular rhythm, normal S1 and S2, no S3, S4, no murmur appreciated, no lower extremity edema  Gastrointestinal: no guarding, non-rigid   Neurologic: awake, alert, moves all extremities  Skin: no jaundice, warm on limited exam  Psychiatric: affect is normal, answers questions appropriately, oriented to self and place    Data reviewed:  Lab Results   Component Value Date    HGB 10.3 (L) 10/28/2005     ALT   Date Value Ref Range Status   10/27/2022 12 10 - 35 U/L Final     Recent Labs   Lab Test 10/27/22  1403   CHOL 210*   HDL 77   *   TRIG 125      No results found for: A1C     Recent Results (from the past 4320 hour(s))   Echocardiogram Limited   Result Value    LVEF  55-60%    Narrative    [id number]  [id number]  [id number]  [id number]^BARRY^BASIL^SARAH     [facility]  Echocardiography Laboratory  [address] [place] [address]     Name: CLAUDIA GRAVES  MRN: [id number]  : 1960  Study Date: 10/27/2022 12:42 PM  Age: 62  yrs  Gender: Female  Patient Location: [facility]  Reason For Study: Left ventricular hypertrophy  Ordering Physician: BASIL REDDY  Referring Physician: Greta [place]  Performed By: William Rivers RDCS     BSA: 2.1 m2  Height: 64 in  Weight: 235 lb  HR: 70  BP: 132/83 mmHg  ______________________________________________________________________________  Procedure  Complete Echo Adult.  ______________________________________________________________________________  Interpretation Summary     The left ventricle is normal in structure, function and size.  Grade I or early diastolic dysfunction.  The right ventricle is normal in structure, function and size.  No significant valve issues. The study was technically difficult.  ______________________________________________________________________________  Left Ventricle  The left ventricle is normal in size. The left ventricle is normal in  structure, function and size. There is normal left ventricular wall thickness.  The visual ejection fraction is 55-60%. Grade I or early diastolic  dysfunction. Diastolic Doppler findings (E/E' ratio and/or other parameters)  suggest left ventricular filling pressures are normal. No regional wall motion  abnormalities noted.     Right Ventricle  The right ventricle is normal in structure, function and size.     Atria  The left atrium is borderline dilated. Right atrial size is normal. There is  no atrial shunt seen.     Mitral Valve  The mitral valve leaflets appear normal. There is no evidence of stenosis,  fluttering, or prolapse. There is no mitral regurgitation noted.     Tricuspid Valve  Normal tricuspid valve. No tricuspid regurgitation. Right ventricular systolic  pressure could not be approximated due to inadequate tricuspid regurgitation.  IVC diameter <2.1 cm collapsing >50% with sniff suggests a normal RA pressure  of 3 mmHg.     Aortic Valve  The aortic valve is not well visualized. There is mild aortic sclerosis of  the  non-coronary cusp. There is trace aortic regurgitation. No aortic stenosis is  present.     Pulmonic Valve  The pulmonic valve is not well visualized. There is no pulmonic valvular  regurgitation.     Vessels  Normal size aorta.     Pericardium  There is no pericardial effusion.     Rhythm  Sinus rhythm was noted.  ______________________________________________________________________________  MMode/2D Measurements & Calculations  IVSd: 1.1 cm     LVIDd: 4.9 cm  LVIDs: 2.9 cm  LVPWd: 1.1 cm  IVC diam: 1.5 cm  FS: 40.7 %  LV mass(C)d: 197.5 grams  LV mass(C)dI: 94.3 grams/m2  Ao root diam: 3.5 cm  LA dimension: 3.8 cm  asc Aorta Diam: 3.4 cm  LA/Ao: 1.1  LVOT diam: 2.2 cm  LVOT area: 3.7 cm2  LA Volume Index (BP): 33.0 ml/m2  RWT: 0.46     Doppler Measurements & Calculations  MV E max jb: 71.4 cm/sec  MV A max jb: 79.4 cm/sec  MV E/A: 0.90  MV max P.8 mmHg  MV mean P.4 mmHg  MV V2 VTI: 26.9 cm  MVA(VTI): 2.7 cm2  MV dec slope: 296.7 cm/sec2  MV dec time: 0.24 sec  AI P1/2t: 683.7 msec  Ao acc time: 0.10 sec  LV V1 max P.1 mmHg  LV V1 max: 100.9 cm/sec  LV V1 VTI: 19.8 cm  SV(LVOT): 73.5 ml  SI(LVOT): 35.1 ml/m2  E/E': 11.8  Peak E' Jb: 6.1 cm/sec     ______________________________________________________________________________  Report approved by: Ruiz Roger 10/27/2022 03:14 PM                  Thank you for allowing me to participate in the care of your patient.      Sincerely,     Shane Geiger MD     [facility]  cc:   Shane Geiger MD  [id number] [facility] [address] [place] [id number]

## 2022-12-07 NOTE — PROGRESS NOTES
Cardiology Clinic Progress Note:    December 7, 2022   Patient Name: Nadege Hill  Patient MRN: [id number]     Consult indication: HTN    HPI:    I had the opportunity to see patient Nadege Hill in cardiology clinic for a follow up visit. Patient is followed by our colleagues at [facility] with Primary Care.     As you know, patient is a pleasant 62-year-old female with a past medical history significant for obesity, hypertension, who presents for follow up.    I had initially met patient in cardiology clinic for consultation 10/27/2021 after an echocardiogram through the [facility] system demonstrated mild concentric hypertrophy.  She had been seen by her PCP for a toenail issue, and was noted to have a cardiac murmur on physical exam.  Patient underwent a TTE through the [facility] system 9/9/2021, images are not available for review, however report notes LVEF 60%, sigmoid septum with mild concentric hypertrophy, no significant valvular abnormalities.    We discussed the significance of this, in the context of longstanding hypertension.  Since then, patient reports that she has been doing well.  Blood pressure today in clinic is 102/66 mmHg, and at home around 110/70 mmHg.  She exercises regularly by power walking twice a day with her bhatia retriever, other women in her neighborhood of seen her walking and have actually joined her.  She is also been more mindful about her diet, avoiding carbohydrates, and incorporating more vegetable/plants.  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  She denies any chest pain, chest pressure, abnormal shortness of breath, and has gone down in pant size several sizes.  Her son recently got .    No family history of early ASCVD, no family history of hypertrophic cardiomyopathy or sudden cardiac death.    TTE 10/27/2022  Interpretation Summary     The left ventricle is normal in structure,  function and size.  Grade I or early diastolic dysfunction.  The right ventricle is normal in structure, function and size.  No significant valve issues. The study was technically difficult.    Assessment and Plan/Recommendations:    In summary, patient is a pleasant 62-year-old female with a past medical history significant for obesity, hypertension, who presents for follow up.  Blood pressure is under better control now, and she is exercising regularly, also adhering to a heart healthy diet.  She denies symptoms concerning for angina or decompensated heart failure.  TTE 10/27/2022 was reviewed personally, overall findings are favorable, demonstrating normal biventricular function, no significant valvular abnormalities.  She does still have borderline concentric LVH, with expected Doppler parameters suggesting mild/early diastolic dysfunction, however she does not have any clinical evidence of heart failure.  Recommend continued blood pressure control, healthy lifestyle habits including regular exercise, efforts at weight loss.  Follow-up in cardiology clinic as needed in the future.    Thank you for allowing our team to participate in the care of Nadege Hill.  Please do not hesitate to call or page me with any questions or concerns.    Sincerely,     Shane Geiger MD, [facility]  Cardiology  Text Page   December 7, 2022    Voice recognition software utilized.     Total time spent on this encounter: 35 minutes, providing care in this encounter including, but not limited to, reviewing prior medical records, laboratory data, imaging studies, diagnostic studies, procedure notes, formulating an assessment and plan, recommendations, discussion and counseling with patient face to face, dictation.    Past Medical History:     Past Medical History:   Diagnosis Date     Anxiety      Benign essential hypertension 10/27/2021     Cancer (H)     melanoma skin cancer     Depression      Dyslipidemia 10/27/2021      Hypertension      Left ventricular hypertrophy 10/27/2021     Renal disease     chronic diuretic use     Uncomplicated asthma         Past Surgical History:   Past Surgical History:   Procedure Laterality Date     BLEPHAROPLASTY BILATERAL Bilateral 1/29/2019    Procedure: BILATERAL UPPER LID BLEPHAROPLASTY;  Surgeon: Lakisha Huerta MD;  Location:  OR     COSMETIC BLEPHAROPLASTY LOWER LID Bilateral 1/29/2019    Procedure: BILATERAL LOWER LID COSMETIC BLEPHAROPLASTY;  Surgeon: Lakisha Huerta MD;  Location:  OR     GYN SURGERY      [facility]     GYN SURGERY         Medications (outpatient):  Current Outpatient Medications   Medication Sig Dispense Refill     albuterol (PROVENTIL) (2.5 MG/3ML) 0.083% neb solution Take 2.5 mg by nebulization every 6 hours as needed for shortness of breath / dyspnea or wheezing       busPIRone (BUSPAR) 15 MG tablet Take 30 mg by mouth daily        FLUoxetine (PROZAC) 40 MG capsule Take 40 mg by mouth 2 times daily       fluticasone-salmeterol (ADVAIR) 100-50 MCG/DOSE inhaler Inhale 1 puff into the lungs every 12 hours       losartan-hydrochlorothiazide (HYZAAR) 100-25 MG tablet Take 1 tablet by mouth daily 90 tablet 3     potassium chloride ER (K-DUR/KLOR-CON M) 20 MEQ CR tablet Take 20 mEq by mouth daily       traZODone (DESYREL) 50 MG tablet Take 50 mg by mouth At Bedtime PRN       methylPREDNISolone (MEDROL DOSEPAK) 4 MG tablet therapy pack Follow Package Directions (Patient not taking: Reported on 10/27/2021) 21 tablet 0     multivitamin w/minerals (MULTI-VITAMIN) tablet Take 1 tablet by mouth daily         Allergies:  Allergies   Allergen Reactions     Kiwi Rash     Pcn [Penicillins] Rash       Social History:   History   Drug Use Unknown      History   Smoking Status     Never   Smokeless Tobacco     Never     Social History    Substance and Sexual Activity      Alcohol use: Yes        Comment: socially       Family History:  History reviewed. No pertinent family  history.    Review of Systems:   A complete review of systems was negative except as mentioned in the History of Present Illness.     Objective & Physical Exam:  /66   Pulse 80   Wt 110.9 kg (244 lb 8 oz)   SpO2 97%   BMI 41.97 kg/m    Wt Readings from Last 2 Encounters:   22 110.9 kg (244 lb 8 oz)   10/27/21 106.2 kg (234 lb 3.2 oz)     Body mass index is 41.97 kg/m .   Body surface area is 2.24 meters squared.    Constitutional: appears stated age, in no apparent distress, appears to be well nourished  Head: normocephalic, atraumatic  Neck: supple, trachea midline, no bruit bilaterally   Pulmonary: clear to auscultation bilaterally, no wheezes, no rales, no increased work of breathing  Cardiovascular: JVP normal, regular rate, regular rhythm, normal S1 and S2, no S3, S4, no murmur appreciated, no lower extremity edema  Gastrointestinal: no guarding, non-rigid   Neurologic: awake, alert, moves all extremities  Skin: no jaundice, warm on limited exam  Psychiatric: affect is normal, answers questions appropriately, oriented to self and place    Data reviewed:  Lab Results   Component Value Date    HGB 10.3 (L) 10/28/2005     ALT   Date Value Ref Range Status   10/27/2022 12 10 - 35 U/L Final     Recent Labs   Lab Test 10/27/22  1403   CHOL 210*   HDL 77   *   TRIG 125      No results found for: A1C     Recent Results (from the past 4320 hour(s))   Echocardiogram Limited   Result Value    LVEF  55-60%    Narrative    [id number]  [id number]  [id number]  [id number]^BARRY^BASIL^SARAH     [facility]  Echocardiography Laboratory  [address] [place] [address]     Name: CLAUDIA GRAVES  MRN: [id number]  : 1960  Study Date: 10/27/2022 12:42 PM  Age: 62 yrs  Gender: Female  Patient Location: [facility]  Reason For Study: Left ventricular hypertrophy  Ordering Physician: BASIL REDDY  Referring Physician: Greta Arboleda  Performed By: William Rivers RDCS     BSA: 2.1 m2  Height: 64  in  Weight: 235 lb  HR: 70  BP: 132/83 mmHg  ______________________________________________________________________________  Procedure  Complete Echo Adult.  ______________________________________________________________________________  Interpretation Summary     The left ventricle is normal in structure, function and size.  Grade I or early diastolic dysfunction.  The right ventricle is normal in structure, function and size.  No significant valve issues. The study was technically difficult.  ______________________________________________________________________________  Left Ventricle  The left ventricle is normal in size. The left ventricle is normal in  structure, function and size. There is normal left ventricular wall thickness.  The visual ejection fraction is 55-60%. Grade I or early diastolic  dysfunction. Diastolic Doppler findings (E/E' ratio and/or other parameters)  suggest left ventricular filling pressures are normal. No regional wall motion  abnormalities noted.     Right Ventricle  The right ventricle is normal in structure, function and size.     Atria  The left atrium is borderline dilated. Right atrial size is normal. There is  no atrial shunt seen.     Mitral Valve  The mitral valve leaflets appear normal. There is no evidence of stenosis,  fluttering, or prolapse. There is no mitral regurgitation noted.     Tricuspid Valve  Normal tricuspid valve. No tricuspid regurgitation. Right ventricular systolic  pressure could not be approximated due to inadequate tricuspid regurgitation.  IVC diameter <2.1 cm collapsing >50% with sniff suggests a normal RA pressure  of 3 mmHg.     Aortic Valve  The aortic valve is not well visualized. There is mild aortic sclerosis of the  non-coronary cusp. There is trace aortic regurgitation. No aortic stenosis is  present.     Pulmonic Valve  The pulmonic valve is not well visualized. There is no pulmonic valvular  regurgitation.     Vessels  Normal size aorta.      Pericardium  There is no pericardial effusion.     Rhythm  Sinus rhythm was noted.  ______________________________________________________________________________  MMode/2D Measurements & Calculations  IVSd: 1.1 cm     LVIDd: 4.9 cm  LVIDs: 2.9 cm  LVPWd: 1.1 cm  IVC diam: 1.5 cm  FS: 40.7 %  LV mass(C)d: 197.5 grams  LV mass(C)dI: 94.3 grams/m2  Ao root diam: 3.5 cm  LA dimension: 3.8 cm  asc Aorta Diam: 3.4 cm  LA/Ao: 1.1  LVOT diam: 2.2 cm  LVOT area: 3.7 cm2  LA Volume Index (BP): 33.0 ml/m2  RWT: 0.46     Doppler Measurements & Calculations  MV E max jb: 71.4 cm/sec  MV A max jb: 79.4 cm/sec  MV E/A: 0.90  MV max P.8 mmHg  MV mean P.4 mmHg  MV V2 VTI: 26.9 cm  MVA(VTI): 2.7 cm2  MV dec slope: 296.7 cm/sec2  MV dec time: 0.24 sec  AI P1/2t: 683.7 msec  Ao acc time: 0.10 sec  LV V1 max P.1 mmHg  LV V1 max: 100.9 cm/sec  LV V1 VTI: 19.8 cm  SV(LVOT): 73.5 ml  SI(LVOT): 35.1 ml/m2  E/E': 11.8  Peak E' Jb: 6.1 cm/sec     ______________________________________________________________________________  Report approved by: Ruiz Roger 10/27/2022 03:14 PM

## 2023-11-05 ENCOUNTER — HEALTH MAINTENANCE LETTER (OUTPATIENT)
Age: 63
End: 2023-11-05

## 2024-01-14 ENCOUNTER — HEALTH MAINTENANCE LETTER (OUTPATIENT)
Age: 64
End: 2024-01-14

## 2025-01-26 ENCOUNTER — HEALTH MAINTENANCE LETTER (OUTPATIENT)
Age: 65
End: 2025-01-26

## 2025-07-04 ENCOUNTER — HOSPITAL ENCOUNTER (EMERGENCY)
Facility: CLINIC | Age: 65
Discharge: HOME | End: 2025-07-04
Attending: PHYSICIAN ASSISTANT | Admitting: PHYSICIAN ASSISTANT
Payer: COMMERCIAL

## 2025-07-04 ENCOUNTER — APPOINTMENT (OUTPATIENT)
Dept: CT IMAGING | Facility: CLINIC | Age: 65
End: 2025-07-04
Attending: PHYSICIAN ASSISTANT
Payer: COMMERCIAL

## 2025-07-04 ENCOUNTER — APPOINTMENT (OUTPATIENT)
Dept: GENERAL RADIOLOGY | Facility: CLINIC | Age: 65
End: 2025-07-04
Attending: PHYSICIAN ASSISTANT
Payer: COMMERCIAL

## 2025-07-04 VITALS
SYSTOLIC BLOOD PRESSURE: 153 MMHG | WEIGHT: 198.85 LBS | DIASTOLIC BLOOD PRESSURE: 95 MMHG | TEMPERATURE: 98.1 F | HEART RATE: 92 BPM | OXYGEN SATURATION: 100 % | BODY MASS INDEX: 33.95 KG/M2 | HEIGHT: 64 IN | RESPIRATION RATE: 18 BRPM

## 2025-07-04 DIAGNOSIS — M25.461 PAIN AND SWELLING OF RIGHT KNEE: ICD-10-CM

## 2025-07-04 DIAGNOSIS — T07.XXXA ABRASIONS OF MULTIPLE SITES: ICD-10-CM

## 2025-07-04 DIAGNOSIS — M25.561 PAIN AND SWELLING OF RIGHT KNEE: ICD-10-CM
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
## 2025-07-04 PROCEDURE — 73562 X-RAY EXAM OF KNEE 3: CPT | Mod: RT

## 2025-07-04 PROCEDURE — 70450 CT HEAD/BRAIN W/O DYE: CPT

## 2025-07-04 PROCEDURE — 99284 EMERGENCY DEPT VISIT MOD MDM: CPT | Mod: 25 | Performed by: PHYSICIAN ASSISTANT

## 2025-07-04 ASSESSMENT — COLUMBIA-SUICIDE SEVERITY RATING SCALE - C-SSRS
6. HAVE YOU EVER DONE ANYTHING, STARTED TO DO ANYTHING, OR PREPARED TO DO ANYTHING TO END YOUR LIFE?: NO
2. HAVE YOU ACTUALLY HAD ANY THOUGHTS OF KILLING YOURSELF IN THE PAST MONTH?: NO
1. IN THE PAST MONTH, HAVE YOU WISHED YOU WERE DEAD OR WISHED YOU COULD GO TO SLEEP AND NOT WAKE UP?: NO

## 2025-07-04 ASSESSMENT — ACTIVITIES OF DAILY LIVING (ADL)
ADLS_ACUITY_SCORE: 41
ADLS_ACUITY_SCORE: 41
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
## 2025-07-04 NOTE — DISCHARGE INSTRUCTIONS
Clean wounds daily with water and soap.  Keep covered with topical antibiotics and bandages.  Use Tylenol 1000mg every 8 hours for pain.
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
## (undated) DEVICE — SOL NACL 0.9% IRRIG 1000ML BOTTLE 07138-09

## (undated) DEVICE — LINEN TOWEL PACK X5 5464

## (undated) DEVICE — SU VICRYL 7-0 TG140-8DA 18" J546G

## (undated) DEVICE — PACK OCULOPLATIC SEN15OCFSD

## (undated) DEVICE — GLOVE PROTEXIS MICRO 6.5  2D73PM65

## (undated) DEVICE — SU PLAIN FAST ABSORB 6-0 PC-1 18" 1916G

## (undated) DEVICE — ESU PENCIL W/HOLSTER E2350H

## (undated) DEVICE — SU VICRYL 5-0 P-2 18" UND J503G

## (undated) DEVICE — ESU NDL COLORADO MICRO 3CM STR N103A

## (undated) DEVICE — SU PROLENE 5-0 RB-2DA 30" 8710H

## (undated) DEVICE — SOL WATER IRRIG 1000ML BOTTLE 2F7114

## (undated) DEVICE — BLADE KNIFE SURG 12 371112

## (undated) DEVICE — SU PROLENE 6-0 P-1 18" 8697G

## (undated) DEVICE — EYE PREP BETADINE 5% SOLUTION 30ML 0065-0411-30

## (undated) RX ORDER — PROPOFOL 10 MG/ML
INJECTION, EMULSION INTRAVENOUS
Status: DISPENSED
Start: 2019-01-29

## (undated) RX ORDER — FENTANYL CITRATE 50 UG/ML
INJECTION, SOLUTION INTRAMUSCULAR; INTRAVENOUS
Status: DISPENSED
Start: 2019-01-29

## (undated) RX ORDER — ACETAMINOPHEN 325 MG/1
TABLET ORAL
Status: DISPENSED
Start: 2019-01-29

## (undated) RX ORDER — LIDOCAINE HYDROCHLORIDE 20 MG/ML
INJECTION, SOLUTION EPIDURAL; INFILTRATION; INTRACAUDAL; PERINEURAL
Status: DISPENSED
Start: 2019-01-29

## (undated) RX ORDER — ONDANSETRON 2 MG/ML
INJECTION INTRAMUSCULAR; INTRAVENOUS
Status: DISPENSED
Start: 2019-01-29